# Patient Record
Sex: FEMALE | Race: BLACK OR AFRICAN AMERICAN | Employment: FULL TIME | URBAN - METROPOLITAN AREA
[De-identification: names, ages, dates, MRNs, and addresses within clinical notes are randomized per-mention and may not be internally consistent; named-entity substitution may affect disease eponyms.]

---

## 2022-05-26 NOTE — PROGRESS NOTES
ANNUAL EXAM AGES 18-39    Kasandra Whalen is a [de-identified] ,  24 y.o. female   No LMP recorded. She presents for her annual checkup. She is having no significant problems. Menstrual status:  Her periods are absent due to Depo Provera. Contraception:  The current method of family planning is Depo Provera. Shot pass due. Wants refill. Sexual history:  She  reports being sexually active and has had partner(s) who are female. She reports using the following method of birth control/protection: Injection. Medical conditions:  Since her last annual GYN exam about one year ago, she has had the following changes in her health history: none. Pap and Mammogram History:  Her most recent Pap smear was normal, obtained 2021. The patient has never had a mammogram.    Gyn Flowsheet:  No flowsheet data found. Breast Cancer History:  neg    Medical History: There is no problem list on file for this patient. Past Medical History:   Diagnosis Date    Migraines     Severe dysmenorrhea      No past surgical history on file. OB History    Para Term  AB Living   0 0 0 0 0 0   SAB IAB Ectopic Molar Multiple Live Births   0 0 0 0 0 0     Social History     Socioeconomic History    Marital status: SINGLE   Tobacco Use    Smoking status: Never Smoker    Smokeless tobacco: Never Used   Vaping Use    Vaping Use: Never used   Substance and Sexual Activity    Sexual activity: Yes     Partners: Female     Birth control/protection: Injection      No family history on file. No current outpatient medications on file prior to visit. No current facility-administered medications on file prior to visit.      No Known Allergies    Review of Systems:  History obtained from the patient-negative for:  Constitutional: weight loss, fever, night sweats  HEENT: hearing loss, earache, congestion, snoring, sorethroat  CV: chest pain, palpitations, edema  Resp: cough, shortness of breath, wheezing  Breast: breast lumps, nipple discharge, galactorrhea  GI: change in bowel habits, abdominal pain, black or bloody stools  : frequency, dysuria, hematuria, vaginal discharge  MSK: back pain, joint pain, muscle pain  Skin: itching, rash, hives  Neuro: dizziness, headache, confusion, weakness  Psych: anxiety, depression, change in mood  Heme/lymph: bleeding, bruising, pallor    Physical Exam  There were no vitals taken for this visit.     Constitutional  · Appearance: well-nourished, well developed, alert, in no acute distress    HENT  · Head and Face: appears normal    Neck  · Inspection/Palpation: normal appearance, no masses or tenderness  · Lymph Nodes: no lymphadenopathy present  · Thyroid: gland size normal, nontender, no nodules or masses present on palpation    Chest  · Respiratory Effort: breathing unlabored  · Auscultation: normal breath sounds    Cardiovascular  · Heart:  · Auscultation: regular rate and rhythm without murmur    Breasts  · Inspection of Breasts: breasts symmetrical, no skin changes, no discharge present, nipple appearance normal, no skin retraction present  · Palpation of Breasts and Axillae: no masses present on palpation, no breast tenderness  · Axillary Lymph Nodes: no lymphadenopathy present    Gastrointestinal  · Abdominal Examination: abdomen non-tender to palpation, normal bowel sounds, no masses present  · Liver and spleen: no hepatomegaly present, spleen not palpable  · Hernias: no hernias identified    Genitourinary  · External Genitalia: normal appearance for age, no discharge present, no tenderness present, no inflammatory lesions present, no masses present, no atrophy present  · Vagina: normal vaginal vault without central or paravaginal defects, no discharge present, no inflammatory lesions present, no masses present  · Bladder: non-tender to palpation  · Urethra: appears normal  · Cervix: normal   · Uterus: normal size, shape and consistency  · Adnexa: no adnexal tenderness present, no adnexal masses present  · Perineum: perineum within normal limits, no evidence of trauma, no rashes or skin lesions present  · Anus: anus within normal limits, no hemorrhoids present  · Inguinal Lymph Nodes: no lymphadenopathy present    Skin  · General Inspection: no rash, no lesions identified    Neurologic/Psychiatric  · Mental Status:  · Orientation: grossly oriented to person, place and time  · Mood and Affect: mood normal, affect appropriate    Labs:  No results found for this or any previous visit (from the past 12 hour(s)). Assessment:  Routine gynecologic examination  Her current medical status is satisfactory with no evidence of significant gynecologic issues.     Plan:  Counseled re: diet, exercise, healthy lifestyle  Return for yearly wellness visits  Refill Depo   Rec screening mammo at 40

## 2022-05-27 ENCOUNTER — OFFICE VISIT (OUTPATIENT)
Dept: OBGYN CLINIC | Age: 22
End: 2022-05-27
Payer: COMMERCIAL

## 2022-05-27 VITALS
DIASTOLIC BLOOD PRESSURE: 80 MMHG | HEIGHT: 66 IN | BODY MASS INDEX: 16.07 KG/M2 | SYSTOLIC BLOOD PRESSURE: 116 MMHG | WEIGHT: 100 LBS

## 2022-05-27 DIAGNOSIS — Z01.419 ROUTINE GYNECOLOGICAL EXAMINATION: Primary | ICD-10-CM

## 2022-05-27 PROCEDURE — 99203 OFFICE O/P NEW LOW 30 MIN: CPT | Performed by: OBSTETRICS & GYNECOLOGY

## 2022-05-27 RX ORDER — MEDROXYPROGESTERONE ACETATE 150 MG/ML
INJECTION, SUSPENSION INTRAMUSCULAR
COMMUNITY
Start: 2022-02-20 | End: 2022-05-27 | Stop reason: SDUPTHER

## 2022-05-27 RX ORDER — MEDROXYPROGESTERONE ACETATE 150 MG/ML
INJECTION, SUSPENSION INTRAMUSCULAR
Qty: 1 EACH | Refills: 3 | Status: SHIPPED | OUTPATIENT
Start: 2022-05-27

## 2022-05-27 RX ORDER — TRETINOIN 0.5 MG/G
CREAM TOPICAL
COMMUNITY
Start: 2022-04-26

## 2022-06-01 LAB
C TRACH RRNA CVX QL NAA+PROBE: NEGATIVE
CYTOLOGIST CVX/VAG CYTO: NORMAL
CYTOLOGY CVX/VAG DOC CYTO: NORMAL
CYTOLOGY CVX/VAG DOC THIN PREP: NORMAL
DX ICD CODE: NORMAL
HPV I/H RISK 4 DNA CVX QL PROBE+SIG AMP: NEGATIVE
Lab: NORMAL
N GONORRHOEA RRNA CVX QL NAA+PROBE: NEGATIVE
OTHER STN SPEC: NORMAL
STAT OF ADQ CVX/VAG CYTO-IMP: NORMAL
T VAGINALIS RRNA SPEC QL NAA+PROBE: NEGATIVE

## 2022-08-30 ENCOUNTER — HOSPITAL ENCOUNTER (EMERGENCY)
Age: 22
Discharge: HOME | End: 2022-08-30
Attending: STUDENT IN AN ORGANIZED HEALTH CARE EDUCATION/TRAINING PROGRAM
Payer: COMMERCIAL

## 2022-08-30 VITALS
WEIGHT: 105 LBS | HEIGHT: 67 IN | TEMPERATURE: 98.5 F | SYSTOLIC BLOOD PRESSURE: 114 MMHG | DIASTOLIC BLOOD PRESSURE: 82 MMHG | HEART RATE: 105 BPM | OXYGEN SATURATION: 99 % | RESPIRATION RATE: 16 BRPM | BODY MASS INDEX: 16.48 KG/M2

## 2022-08-30 DIAGNOSIS — J02.9 ACUTE PHARYNGITIS, UNSPECIFIED ETIOLOGY: Primary | ICD-10-CM

## 2022-08-30 LAB — DEPRECATED S PYO AG THROAT QL EIA: NEGATIVE

## 2022-08-30 PROCEDURE — 87070 CULTURE OTHR SPECIMN AEROBIC: CPT

## 2022-08-30 PROCEDURE — 87880 STREP A ASSAY W/OPTIC: CPT

## 2022-08-30 PROCEDURE — 74011250637 HC RX REV CODE- 250/637: Performed by: STUDENT IN AN ORGANIZED HEALTH CARE EDUCATION/TRAINING PROGRAM

## 2022-08-30 PROCEDURE — 99283 EMERGENCY DEPT VISIT LOW MDM: CPT

## 2022-08-30 RX ORDER — IBUPROFEN 400 MG/1
400 TABLET ORAL
Status: COMPLETED | OUTPATIENT
Start: 2022-08-30 | End: 2022-08-30

## 2022-08-30 RX ORDER — IBUPROFEN 400 MG/1
400 TABLET ORAL
Qty: 20 TABLET | Refills: 0 | Status: SHIPPED | OUTPATIENT
Start: 2022-08-30

## 2022-08-30 RX ADMIN — IBUPROFEN 400 MG: 400 TABLET ORAL at 13:24

## 2022-08-30 NOTE — ED PROVIDER NOTES
Patient is a relatively healthy 66-year-old female presenting today with sore throat she has had symptoms for the past 2 to 3 days. She reports that it hurts on the left side of her throat. No trouble breathing or swallowing. No fevers. No ear pain. No rashes or wounds. No other complaints noted at this time. She has not taken anything for the pain. Describes a sharp pain isolated to the left throat. Past Medical History:   Diagnosis Date    Migraines     Severe dysmenorrhea        History reviewed. No pertinent surgical history. Family History:   Problem Relation Age of Onset    Heart Failure Maternal Grandmother     Breast Cancer Maternal Great Grandmother        Social History     Socioeconomic History    Marital status: SINGLE     Spouse name: Not on file    Number of children: Not on file    Years of education: Not on file    Highest education level: Not on file   Occupational History    Not on file   Tobacco Use    Smoking status: Never    Smokeless tobacco: Never   Vaping Use    Vaping Use: Never used   Substance and Sexual Activity    Alcohol use: Yes     Comment: socially    Drug use: Never    Sexual activity: Yes     Partners: Female     Birth control/protection: Injection   Other Topics Concern    Not on file   Social History Narrative    Not on file     Social Determinants of Health     Financial Resource Strain: Not on file   Food Insecurity: Not on file   Transportation Needs: Not on file   Physical Activity: Not on file   Stress: Not on file   Social Connections: Not on file   Intimate Partner Violence: Not on file   Housing Stability: Not on file         ALLERGIES: Patient has no known allergies. Review of Systems   Constitutional:  Negative for fever. HENT:  Positive for sore throat. Respiratory:  Negative for shortness of breath. All other systems reviewed and are negative.     Vitals:    08/30/22 1256 08/30/22 1310 08/30/22 1355   BP: 129/84  114/82   Pulse: (!) 105 Resp: 16     Temp: 98.5 °F (36.9 °C)     SpO2: 99% 99%    Weight: 47.6 kg (105 lb)     Height: 5' 7\" (1.702 m)              Physical Exam  Constitutional:       General: She is not in acute distress. Appearance: She is well-developed. HENT:      Head: Normocephalic. Mouth/Throat:      Comments: Mild erythema noted to the left posterior oropharynx without vesicular lesions. No tonsillar swelling. Uvula midline. .  No trismus. No stridor. Eyes:      Conjunctiva/sclera: Conjunctivae normal.   Pulmonary:      Effort: Pulmonary effort is normal. No respiratory distress. Musculoskeletal:         General: Normal range of motion. Cervical back: Normal range of motion. Skin:     General: Skin is warm and dry. Capillary Refill: Capillary refill takes less than 2 seconds. Psychiatric:         Behavior: Behavior normal.        MDM         Procedures      Negative  Patient is a 19-year-old female who is otherwise well-appearing and in no acute distress with stable vital signs here today with left-sided throat pain. On my clinical exam I see no evidence of a peritonsillar abscess. Strep negative. Uvula midline. No vesicular lesions. No other rash to suggest HFM. Suspect viral etiology. Okay for discharge home with conservative management. Return precautions provided. ICD-10-CM ICD-9-CM    1.  Acute pharyngitis, unspecified etiology  J02.9 SKIP/ Agus Manzanares 33, DO

## 2022-09-01 LAB
BACTERIA SPEC CULT: NORMAL
SERVICE CMNT-IMP: NORMAL

## 2023-01-22 ENCOUNTER — HOSPITAL ENCOUNTER (EMERGENCY)
Age: 23
Discharge: HOME | End: 2023-01-22
Attending: STUDENT IN AN ORGANIZED HEALTH CARE EDUCATION/TRAINING PROGRAM
Payer: COMMERCIAL

## 2023-01-22 VITALS
BODY MASS INDEX: 16.23 KG/M2 | WEIGHT: 103.4 LBS | HEART RATE: 88 BPM | DIASTOLIC BLOOD PRESSURE: 75 MMHG | HEIGHT: 67 IN | TEMPERATURE: 99 F | SYSTOLIC BLOOD PRESSURE: 120 MMHG | RESPIRATION RATE: 16 BRPM | OXYGEN SATURATION: 97 %

## 2023-01-22 DIAGNOSIS — R09.89 RUNNY NOSE: Primary | ICD-10-CM

## 2023-01-22 PROCEDURE — 99282 EMERGENCY DEPT VISIT SF MDM: CPT

## 2023-01-22 NOTE — Clinical Note
[address]  [facility]  [address] [id number]  [phone]    Work/School Note    Date: 1/22/2023    To Whom It May concern:    Vera Moeller was seen and treated today in the emergency room by the following provider(s):  No providers found. Vera Moeller is excused from work/school on 01/24/23 and 01/25/23. She is medically clear to return to work/school on 1/26/2023.        Sincerely,          Leticia Mckeon RN

## 2023-01-22 NOTE — Clinical Note
[address]  [facility]  [address] [id number]  [phone]    Work/School Note    Date: 1/22/2023    To Whom It May concern:    Aimee Joya was seen and treated today in the emergency room by the following provider(s):  Attending Provider: Corazon Smalls DO.      Aimee Joya is excused from work/school on 01/22/23 and 01/23/23. She is medically clear to return to work/school on 1/24/2023.        Sincerely,          Tiffani Lincoln DO

## 2023-01-23 NOTE — ED PROVIDER NOTES
80-year-old female presents ED for evaluation of a runny nose started yesterday. She has had some burning sensation her nose when she breathes. Does feel congested. Has had a headache as well. Also has a sore throat. No cough or shortness of breath. No fevers      Nasal Congestion  Pertinent negatives include no chest pain and no shortness of breath. Past Medical History:   Diagnosis Date    Migraines     Severe dysmenorrhea        History reviewed. No pertinent surgical history. Family History:   Problem Relation Age of Onset    Heart Failure Maternal Grandmother     Breast Cancer Maternal Great Grandmother        Social History     Socioeconomic History    Marital status: SINGLE     Spouse name: Not on file    Number of children: Not on file    Years of education: Not on file    Highest education level: Not on file   Occupational History    Not on file   Tobacco Use    Smoking status: Never    Smokeless tobacco: Never   Vaping Use    Vaping Use: Never used   Substance and Sexual Activity    Alcohol use: Yes     Comment: socially    Drug use: Never    Sexual activity: Yes     Partners: Female     Birth control/protection: Injection   Other Topics Concern    Not on file   Social History Narrative    Not on file     Social Determinants of Health     Financial Resource Strain: Not on file   Food Insecurity: Not on file   Transportation Needs: Not on file   Physical Activity: Not on file   Stress: Not on file   Social Connections: Not on file   Intimate Partner Violence: Not on file   Housing Stability: Not on file         ALLERGIES: Patient has no known allergies. Review of Systems   Constitutional:  Negative for fever. HENT:  Positive for rhinorrhea and sore throat. Respiratory:  Negative for shortness of breath. Cardiovascular:  Negative for chest pain.      Vitals:    01/22/23 1918   BP: 120/75   Pulse: 88   Resp: 16   Temp: 99 °F (37.2 °C)   SpO2: 97%   Weight: 46.9 kg (103 lb 6.4 oz) Height: 5' 7\" (1.702 m)            Physical Exam  Vitals reviewed. Constitutional:       General: She is not in acute distress. Appearance: She is normal weight. She is not toxic-appearing. HENT:      Head: Normocephalic and atraumatic. Right Ear: Tympanic membrane, ear canal and external ear normal.      Left Ear: Tympanic membrane, ear canal and external ear normal.      Nose: Congestion present. Mouth/Throat:      Mouth: Mucous membranes are moist.      Pharynx: Oropharynx is clear. No oropharyngeal exudate or posterior oropharyngeal erythema. Eyes:      Conjunctiva/sclera: Conjunctivae normal.   Cardiovascular:      Rate and Rhythm: Normal rate and regular rhythm. Heart sounds: Normal heart sounds. Pulmonary:      Effort: Pulmonary effort is normal.      Breath sounds: Normal breath sounds. Musculoskeletal:      Cervical back: Normal range of motion. Neurological:      Mental Status: She is alert. Medical Decision Making  58-year-old female who has 1 day of congestion, runny nose, sore throat. ENT exam is reassuring, posterior pharynx is nonerythematous no exudates. Lungs good auscultation bilaterally, is not tachypneic or hypoxic. Afebrile. Discussed likely a viral cold. Recommended saline nose spray for her congestion.   Discharged stable condition with a work note           Procedures

## 2023-01-23 NOTE — ED TRIAGE NOTES
Pt.states she started with runny nose yesterday and today also with headache. States \"burns\" when she breaths in. Feels like she is breathing in dry air. Pt.states she gets headaches every day so isn't really worried about that.  She is more worried about nasal congestion and throat

## 2023-02-16 ENCOUNTER — HOSPITAL ENCOUNTER (EMERGENCY)
Age: 23
Discharge: HOME | End: 2023-02-16
Attending: EMERGENCY MEDICINE
Payer: COMMERCIAL

## 2023-02-16 ENCOUNTER — APPOINTMENT (OUTPATIENT)
Dept: GENERAL RADIOLOGY | Age: 23
End: 2023-02-16
Attending: NURSE PRACTITIONER
Payer: COMMERCIAL

## 2023-02-16 VITALS
BODY MASS INDEX: 15.7 KG/M2 | RESPIRATION RATE: 18 BRPM | OXYGEN SATURATION: 100 % | SYSTOLIC BLOOD PRESSURE: 122 MMHG | TEMPERATURE: 98 F | WEIGHT: 100 LBS | HEIGHT: 67 IN | HEART RATE: 72 BPM | DIASTOLIC BLOOD PRESSURE: 76 MMHG

## 2023-02-16 DIAGNOSIS — R07.89 MUSCULOSKELETAL CHEST PAIN: Primary | ICD-10-CM

## 2023-02-16 LAB
ALBUMIN SERPL-MCNC: 4.1 G/DL (ref 3.5–5.2)
ALBUMIN/GLOB SERPL: 1.3 (ref 1.1–2.2)
ALP SERPL-CCNC: 62 U/L (ref 35–104)
ALT SERPL-CCNC: 5 U/L (ref 10–35)
ANION GAP SERPL CALC-SCNC: 12 MMOL/L (ref 5–15)
AST SERPL-CCNC: 18 U/L (ref 10–35)
BASOPHILS # BLD: 0.1 K/UL (ref 0–1)
BASOPHILS NFR BLD: 1 % (ref 0–1)
BILIRUB SERPL-MCNC: 0.4 MG/DL (ref 0.2–1)
BUN SERPL-MCNC: 14 MG/DL (ref 6–20)
BUN/CREAT SERPL: 21 (ref 12–20)
CALCIUM SERPL-MCNC: 8.8 MG/DL (ref 8.6–10)
CHLORIDE SERPL-SCNC: 106 MMOL/L (ref 98–107)
CO2 SERPL-SCNC: 23 MMOL/L (ref 22–29)
COMMENT, HOLDF: NORMAL
CREAT SERPL-MCNC: 0.67 MG/DL (ref 0.5–0.9)
DIFFERENTIAL METHOD BLD: ABNORMAL
EOSINOPHIL # BLD: 0.1 K/UL (ref 0–0.4)
EOSINOPHIL NFR BLD: 2 %
ERYTHROCYTE [DISTWIDTH] IN BLOOD BY AUTOMATED COUNT: 14.7 % (ref 11.5–14.5)
GLOBULIN SER CALC-MCNC: 3.2 G/DL (ref 2–4)
GLUCOSE SERPL-MCNC: 92 MG/DL (ref 65–100)
HCT VFR BLD AUTO: 32.5 % (ref 35–47)
HGB BLD-MCNC: 10.3 G/DL (ref 11.5–16)
IMM GRANULOCYTES # BLD AUTO: 0 K/UL (ref 0–0.04)
IMM GRANULOCYTES NFR BLD AUTO: 0 % (ref 0–0.5)
LYMPHOCYTES # BLD: 2.2 K/UL (ref 0.8–3.5)
LYMPHOCYTES NFR BLD: 45 % (ref 12–49)
MCH RBC QN AUTO: 24.5 PG (ref 26–34)
MCHC RBC AUTO-ENTMCNC: 31.7 G/DL (ref 30–36.5)
MCV RBC AUTO: 77.4 FL (ref 80–99)
MONOCYTES # BLD: 0.6 K/UL (ref 0–1)
MONOCYTES NFR BLD: 11 % (ref 5–13)
NEUTS SEG # BLD: 2.1 K/UL (ref 1.8–8)
NEUTS SEG NFR BLD: 41 % (ref 32–75)
NRBC # BLD: 0 K/UL (ref 0–0.01)
NRBC BLD-RTO: 0 PER 100 WBC
PLATELET # BLD AUTO: 387 K/UL (ref 150–400)
PMV BLD AUTO: 11.3 FL (ref 8.9–12.9)
POTASSIUM SERPL-SCNC: 4.2 MMOL/L (ref 3.5–5.1)
PROT SERPL-MCNC: 7.3 G/DL (ref 6.4–8.3)
RBC # BLD AUTO: 4.2 M/UL (ref 3.8–5.2)
SAMPLES BEING HELD,HOLD: NORMAL
SODIUM SERPL-SCNC: 141 MMOL/L (ref 136–145)
TROPONIN I BLD-MCNC: <0.04 NG/ML (ref 0–0.08)
WBC # BLD AUTO: 5.1 K/UL (ref 3.6–11)

## 2023-02-16 PROCEDURE — 74011000250 HC RX REV CODE- 250: Performed by: NURSE PRACTITIONER

## 2023-02-16 PROCEDURE — 36415 COLL VENOUS BLD VENIPUNCTURE: CPT

## 2023-02-16 PROCEDURE — 74011250637 HC RX REV CODE- 250/637: Performed by: NURSE PRACTITIONER

## 2023-02-16 PROCEDURE — 71046 X-RAY EXAM CHEST 2 VIEWS: CPT

## 2023-02-16 PROCEDURE — 85025 COMPLETE CBC W/AUTO DIFF WBC: CPT

## 2023-02-16 PROCEDURE — 99285 EMERGENCY DEPT VISIT HI MDM: CPT

## 2023-02-16 PROCEDURE — 80053 COMPREHEN METABOLIC PANEL: CPT

## 2023-02-16 PROCEDURE — 93005 ELECTROCARDIOGRAM TRACING: CPT

## 2023-02-16 RX ORDER — IBUPROFEN 600 MG/1
600 TABLET ORAL
Qty: 20 TABLET | Refills: 0 | Status: SHIPPED | OUTPATIENT
Start: 2023-02-16

## 2023-02-16 RX ORDER — CYCLOBENZAPRINE HCL 10 MG
10 TABLET ORAL
Qty: 15 TABLET | Refills: 0 | Status: SHIPPED | OUTPATIENT
Start: 2023-02-16

## 2023-02-16 RX ADMIN — ALUMINUM HYDROXIDE, MAGNESIUM HYDROXIDE, AND SIMETHICONE 40 ML: 200; 200; 20 SUSPENSION ORAL at 11:32

## 2023-02-16 NOTE — ED PROVIDER NOTES
HPI     Past Medical History:   Diagnosis Date    Migraines     Severe dysmenorrhea        No past surgical history on file. Family History:   Problem Relation Age of Onset    Heart Failure Maternal Grandmother     Breast Cancer Maternal Great Grandmother        Social History     Socioeconomic History    Marital status: SINGLE     Spouse name: Not on file    Number of children: Not on file    Years of education: Not on file    Highest education level: Not on file   Occupational History    Not on file   Tobacco Use    Smoking status: Never    Smokeless tobacco: Never   Vaping Use    Vaping Use: Never used   Substance and Sexual Activity    Alcohol use: Yes     Comment: socially    Drug use: Never    Sexual activity: Yes     Partners: Female     Birth control/protection: Injection   Other Topics Concern    Not on file   Social History Narrative    Not on file     Social Determinants of Health     Financial Resource Strain: Not on file   Food Insecurity: Not on file   Transportation Needs: Not on file   Physical Activity: Not on file   Stress: Not on file   Social Connections: Not on file   Intimate Partner Violence: Not on file   Housing Stability: Not on file         ALLERGIES: Patient has no known allergies. Review of Systems    Vitals:    02/16/23 1244 02/16/23 1259 02/16/23 1314 02/16/23 1329   BP: 112/74 120/75 116/80 122/76   Pulse:       Resp:       Temp:       SpO2: 100% 100% 100% 100%   Weight:       Height:                Physical Exam     Medical Decision Making  Amount and/or Complexity of Data Reviewed  Labs: ordered. Radiology: ordered. ECG/medicine tests: ordered. Risk  Prescription drug management. ED Course as of 02/16/23 1412   Thu Feb 16, 2023   1114 ECG obtained February 16, 2023 at 1103. Sinus rhythm with sinus rhythm arrhythmia at a rate of 73, normal axis. No ST segment changes. No STEMI. Some Baseline Artifact does limit interpretation.  [SK]      ED Course User Index  [SK] Jose Nava MD     Labs Reviewed   CBC WITH AUTOMATED DIFF - Abnormal; Notable for the following components:       Result Value    HGB 10.3 (*)     HCT 32.5 (*)     MCV 77.4 (*)     MCH 24.5 (*)     RDW 14.7 (*)     EOSINOPHILS 2 (*)     All other components within normal limits   METABOLIC PANEL, COMPREHENSIVE - Abnormal; Notable for the following components:    BUN/Creatinine ratio 21 (*)     ALT (SGPT) 5 (*)     All other components within normal limits   SAMPLES BEING HELD   POC TROPONIN-I       XR CHEST PA LAT    Result Date: 2/16/2023  Normal chest.      2:12 PM  Pt has been reexamined. Pt has no new complaints, changes or physical findings. Care plan outlined and precautions discussed. All available results were reviewed with pt. All medications were reviewed with pt. All of pt's questions and concerns were addressed. Pt agrees to F/U as instructed and agrees to return to ED upon further deterioration. Pt is ready to go home. Minerva Calvo NP    Please note that this dictation was completed with [employer], the [employer] voice recognition software. Quite often unanticipated grammatical, syntax, homophones, and other interpretive errors are inadvertently transcribed by the computer software. Please disregard these errors. Please excuse any errors that have escaped final proofreading. Thank you.     Procedures Comments: Negative    Musculoskeletal:         General: No tenderness. Normal range of motion. Cervical back: Normal range of motion and neck supple. Skin:     General: Skin is warm and dry. Capillary Refill: Capillary refill takes less than 2 seconds. Coloration: Skin is not pale. Findings: No erythema or rash. Neurological:      General: No focal deficit present. Mental Status: She is alert and oriented to person, place, and time. Motor: No weakness. Coordination: Coordination normal.   Psychiatric:         Mood and Affect: Mood normal.         Behavior: Behavior normal.         Thought Content: Thought content normal.         Judgment: Judgment normal.        Medical Decision Making  This is a 25year old female with c/o sharp chest pain in the middle of her chest that started around 1130 last night. Pt denies N/V/D,SOB and abd pain. Differential diagnosis includes PE, MI, musculoskeletal pain and others. 1. Previous notes (external to Emergency room) were reviewed: chart review    2. History was obtained by: patient    3. Chronic medical issues affecting this visit:   Past Medical History:  No date: Migraines  No date: Severe dysmenorrhea  No past surgical history on file. 4. I ordered the following tests, followed by review of final reads by lab and radiology: cbc, cmp, troponin, cxr    5. I considered ordering: lipase    6. Medical intervention: physical assessment, imaging and lab data      Surgical intervention: none indicated    7. Social determinants of health: none    8 Final diagnosis: musculoskeletal chest pain. Medications prescribed: flexeril, ibuprofen    9. Disposition: Discharge to home and follow up with PCP, return to the emergency room with worsening symptoms. Patient in agreement with plan of care.         Problems Addressed:  Musculoskeletal chest pain: acute illness or injury    Amount and/or Complexity of Data Reviewed  External Data Reviewed: notes. Labs: ordered. Decision-making details documented in ED Course. Radiology: ordered and independent interpretation performed. Decision-making details documented in ED Course. ECG/medicine tests: ordered. Risk  Prescription drug management. ED Course as of 02/16/23 1412   Thu Feb 16, 2023   1114 ECG obtained February 16, 2023 at 1103. Sinus rhythm with sinus rhythm arrhythmia at a rate of 73, normal axis. No ST segment changes. No STEMI. Some Baseline Artifact does limit interpretation. [SK]      ED Course User Index  [SK] Lee Ann Johnson MD     Labs Reviewed   CBC WITH AUTOMATED DIFF - Abnormal; Notable for the following components:       Result Value    HGB 10.3 (*)     HCT 32.5 (*)     MCV 77.4 (*)     MCH 24.5 (*)     RDW 14.7 (*)     EOSINOPHILS 2 (*)     All other components within normal limits   METABOLIC PANEL, COMPREHENSIVE - Abnormal; Notable for the following components:    BUN/Creatinine ratio 21 (*)     ALT (SGPT) 5 (*)     All other components within normal limits   SAMPLES BEING HELD   POC TROPONIN-I       XR CHEST PA LAT    Result Date: 2/16/2023  Normal chest.      2:12 PM  Pt has been reexamined. Pt has no new complaints, changes or physical findings. Care plan outlined and precautions discussed. All available results were reviewed with pt. All medications were reviewed with pt. All of pt's questions and concerns were addressed. Pt agrees to F/U as instructed and agrees to return to ED upon further deterioration. Pt is ready to go home. Priscilla Wynn NP    Please note that this dictation was completed with [employer], the computer voice recognition software. Quite often unanticipated grammatical, syntax, homophones, and other interpretive errors are inadvertently transcribed by the computer software. Please disregard these errors. Please excuse any errors that have escaped final proofreading. Thank you.     Procedures

## 2023-02-16 NOTE — ED TRIAGE NOTES
Patient arrives to ed via pov with c/o sharp chest pain in the middle of her chest around 1130 last night, pt denies previous hx of event, pmh migraines. Pt denies N/V/D,SOB and abd pain.

## 2023-02-16 NOTE — Clinical Note
Maryam Avalos was seen and treated in our emergency department on 2/16/2023.     Return to work on February 23, 2023    Tamanna Escalante NP

## 2023-02-16 NOTE — ED NOTES
Patient rounding complete. Patient only reports pain when twisting her upper body area. 7/10. Family at bedside. Updated VS documented. Bed rails up x 2, call bell within reach. No other questions or concerns at this time from patient or family.

## 2023-02-16 NOTE — ED NOTES
Pt given discharge instructions, patient education, 2 prescriptions and follow up information. Pt verbalizes understanding. All questions answered. Pt discharged to home in private vehicle, ambulatory. Pt A&Ox4, RA, pain controlled.

## 2023-02-18 LAB
ATRIAL RATE: 73 BPM
CALCULATED P AXIS, ECG09: -15 DEGREES
CALCULATED R AXIS, ECG10: 84 DEGREES
CALCULATED T AXIS, ECG11: 52 DEGREES
DIAGNOSIS, 93000: NORMAL
P-R INTERVAL, ECG05: 92 MS
Q-T INTERVAL, ECG07: 376 MS
QRS DURATION, ECG06: 76 MS
QTC CALCULATION (BEZET), ECG08: 414 MS
VENTRICULAR RATE, ECG03: 73 BPM

## 2023-03-30 ENCOUNTER — APPOINTMENT (OUTPATIENT)
Dept: CT IMAGING | Age: 23
End: 2023-03-30
Attending: NURSE PRACTITIONER
Payer: COMMERCIAL

## 2023-03-30 ENCOUNTER — APPOINTMENT (OUTPATIENT)
Dept: GENERAL RADIOLOGY | Age: 23
End: 2023-03-30
Attending: NURSE PRACTITIONER
Payer: COMMERCIAL

## 2023-03-30 ENCOUNTER — HOSPITAL ENCOUNTER (EMERGENCY)
Age: 23
Discharge: HOME | End: 2023-03-30
Attending: EMERGENCY MEDICINE
Payer: COMMERCIAL

## 2023-03-30 VITALS
HEART RATE: 61 BPM | SYSTOLIC BLOOD PRESSURE: 115 MMHG | OXYGEN SATURATION: 100 % | DIASTOLIC BLOOD PRESSURE: 73 MMHG | HEIGHT: 66 IN | BODY MASS INDEX: 16.3 KG/M2 | TEMPERATURE: 98 F | RESPIRATION RATE: 14 BRPM | WEIGHT: 101.41 LBS
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
## 2023-03-30 PROCEDURE — 73552 X-RAY EXAM OF FEMUR 2/>: CPT

## 2023-03-30 PROCEDURE — 99284 EMERGENCY DEPT VISIT MOD MDM: CPT

## 2023-03-30 PROCEDURE — 73030 X-RAY EXAM OF SHOULDER: CPT

## 2023-03-30 PROCEDURE — 70450 CT HEAD/BRAIN W/O DYE: CPT

## 2023-03-30 PROCEDURE — 74011250636 HC RX REV CODE- 250/636: Performed by: NURSE PRACTITIONER

## 2023-03-30 PROCEDURE — 96372 THER/PROPH/DIAG INJ SC/IM: CPT

## 2023-03-30 PROCEDURE — 74011250637 HC RX REV CODE- 250/637: Performed by: NURSE PRACTITIONER

## 2023-03-30 RX ORDER — CYCLOBENZAPRINE HCL 10 MG
10 TABLET ORAL
Status: COMPLETED | OUTPATIENT
Start: 2023-03-30 | End: 2023-03-30

## 2023-03-30 RX ORDER — KETOROLAC TROMETHAMINE 30 MG/ML
30 INJECTION, SOLUTION INTRAMUSCULAR; INTRAVENOUS
Status: COMPLETED | OUTPATIENT
Start: 2023-03-30 | End: 2023-03-30

## 2023-03-30 RX ORDER — CYCLOBENZAPRINE HCL 10 MG
10 TABLET ORAL
Qty: 15 TABLET | Refills: 0 | Status: SHIPPED | OUTPATIENT
Start: 2023-03-30 | End: 2023-04-04

## 2023-03-30 RX ORDER — IBUPROFEN 600 MG/1
600 TABLET ORAL
Qty: 20 TABLET | Refills: 0 | Status: SHIPPED | OUTPATIENT
Start: 2023-03-30

## 2023-03-30 RX ADMIN — CYCLOBENZAPRINE 10 MG: 10 TABLET, FILM COATED ORAL at 11:54

## 2023-03-30 RX ADMIN — KETOROLAC TROMETHAMINE 30 MG: 30 INJECTION, SOLUTION INTRAMUSCULAR at 11:54
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
## 2023-03-30 NOTE — Clinical Note
[address]  [facility] EMER[facility]ENC[facility] DEPT  [address] [id number]  [phone]    Work/School Note    Date: 3/30/2023    To Whom It May concern:    Sandra Miller was seen and treated today in the emergency room by the following provider(s):  Attending Provider: Elizabeth Monge MD  Nurse Practitioner: Micheline Crain NP. Sandra Miller is excused from work/school on 3/30/2023 through 4/1/2023. She is medically clear to return to work/school on 4/2/2023.          Sincerely,          Juan Manuel Whitney NP
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
## 2023-04-26 DIAGNOSIS — Z30.9 ENCOUNTER FOR CONTRACEPTIVE MANAGEMENT, UNSPECIFIED TYPE: Primary | ICD-10-CM

## 2023-04-26 RX ORDER — MEDROXYPROGESTERONE ACETATE 150 MG/ML
150 INJECTION, SUSPENSION INTRAMUSCULAR ONCE
Qty: 1 ML | Refills: 0 | Status: SHIPPED | OUTPATIENT
Start: 2023-04-26 | End: 2023-04-26

## 2023-04-30 RX ORDER — CYCLOBENZAPRINE HCL 10 MG
TABLET ORAL 3 TIMES DAILY PRN
COMMUNITY
Start: 2023-02-16

## 2023-04-30 RX ORDER — IBUPROFEN 600 MG/1
TABLET ORAL EVERY 6 HOURS PRN
COMMUNITY
Start: 2023-02-16

## 2023-05-08 ENCOUNTER — HOSPITAL ENCOUNTER (EMERGENCY)
Facility: HOSPITAL | Age: 23
Discharge: HOME | End: 2023-05-08
Attending: EMERGENCY MEDICINE
Payer: COMMERCIAL

## 2023-05-08 VITALS
SYSTOLIC BLOOD PRESSURE: 116 MMHG | DIASTOLIC BLOOD PRESSURE: 79 MMHG | HEART RATE: 60 BPM | TEMPERATURE: 98.7 F | OXYGEN SATURATION: 99 % | RESPIRATION RATE: 17 BRPM

## 2023-05-08 DIAGNOSIS — H00.012 HORDEOLUM EXTERNUM OF RIGHT LOWER EYELID: Primary | ICD-10-CM

## 2023-05-08 PROCEDURE — 99282 EMERGENCY DEPT VISIT SF MDM: CPT

## 2023-05-08 ASSESSMENT — ENCOUNTER SYMPTOMS
VOMITING: 0
NAUSEA: 0
DIARRHEA: 0
SORE THROAT: 0
COUGH: 0
ABDOMINAL PAIN: 0
EYE PAIN: 0
SHORTNESS OF BREATH: 0

## 2023-05-08 ASSESSMENT — PAIN DESCRIPTION - LOCATION: LOCATION: EYE

## 2023-05-08 ASSESSMENT — LIFESTYLE VARIABLES
HOW OFTEN DO YOU HAVE A DRINK CONTAINING ALCOHOL: MONTHLY OR LESS
HOW MANY STANDARD DRINKS CONTAINING ALCOHOL DO YOU HAVE ON A TYPICAL DAY: 1 OR 2

## 2023-05-08 ASSESSMENT — PAIN SCALES - GENERAL: PAINLEVEL_OUTOF10: 8

## 2023-05-08 ASSESSMENT — PAIN - FUNCTIONAL ASSESSMENT: PAIN_FUNCTIONAL_ASSESSMENT: 0-10

## 2023-05-08 NOTE — ED PROVIDER NOTES
precautions. REASSESSMENT            CONSULTS:  None    PROCEDURES:  Unless otherwise noted below, none     Procedures      FINAL IMPRESSION      1.  Hordeolum externum of right lower eyelid          DISPOSITION/PLAN   DISPOSITION Decision To Discharge 05/08/2023 12:44:29 PM      PATIENT REFERRED TO:  Matthew Mcneil MD  [address] [facility] [address]  [phone]    Schedule an appointment as soon as possible for a visit       Ava Argueta, [address] [place] [address]  [phone]    Schedule an appointment as soon as possible for a visit       [facility]  [address]  [phone]  Go to   If symptoms worsen or change      DISCHARGE MEDICATIONS:  Discharge Medication List as of 5/8/2023  1:04 PM            (Please note that portions of this note were completed with a voice recognition program.  Efforts were made to edit the dictations but occasionally words are mis-transcribed.)    CINDY Hobbs (electronically signed)  Emergency Attending Physician / Physician Assistant / Nurse Practitioner             CINDY Polk  05/08/23 1272

## 2023-07-14 DIAGNOSIS — Z30.9 ENCOUNTER FOR CONTRACEPTIVE MANAGEMENT, UNSPECIFIED: ICD-10-CM

## 2023-07-17 ENCOUNTER — TELEPHONE (OUTPATIENT)
Age: 23
End: 2023-07-17

## 2023-07-17 RX ORDER — MEDROXYPROGESTERONE ACETATE 150 MG/ML
150 INJECTION, SUSPENSION INTRAMUSCULAR ONCE
Qty: 1 ML | Refills: 0 | Status: SHIPPED | OUTPATIENT
Start: 2023-07-17 | End: 2023-07-17

## 2023-07-17 RX ORDER — MEDROXYPROGESTERONE ACETATE 150 MG/ML
INJECTION, SUSPENSION INTRAMUSCULAR
OUTPATIENT
Start: 2023-07-17

## 2023-07-17 RX ORDER — MEDROXYPROGESTERONE ACETATE 150 MG/ML
INJECTION, SUSPENSION INTRAMUSCULAR
Refills: 3 | OUTPATIENT
Start: 2023-07-17

## 2023-07-17 NOTE — TELEPHONE ENCOUNTER
Two patient identifiers used  Mother advised of need for daughter to sign hippa form    25year old patient last seen in the office on 5/17/2022 and has next appointment on 8/4/2023 for ae and depo injection    Prescription sent as per MD verbal order to get patient to her scheduled appointment

## 2023-08-04 ENCOUNTER — OFFICE VISIT (OUTPATIENT)
Age: 23
End: 2023-08-04

## 2023-08-04 VITALS
WEIGHT: 102.6 LBS | SYSTOLIC BLOOD PRESSURE: 131 MMHG | BODY MASS INDEX: 16.49 KG/M2 | HEIGHT: 66 IN | DIASTOLIC BLOOD PRESSURE: 90 MMHG

## 2023-08-04 DIAGNOSIS — Z01.419 WELL WOMAN EXAM WITH ROUTINE GYNECOLOGICAL EXAM: Primary | ICD-10-CM

## 2023-08-04 RX ORDER — MEDROXYPROGESTERONE ACETATE 150 MG/ML
INJECTION, SUSPENSION INTRAMUSCULAR
Qty: 1 ML | Refills: 3 | Status: SHIPPED | OUTPATIENT
Start: 2023-08-04

## 2023-08-04 RX ORDER — MEDROXYPROGESTERONE ACETATE 150 MG/ML
INJECTION, SUSPENSION INTRAMUSCULAR
COMMUNITY
Start: 2023-07-17 | End: 2023-08-04 | Stop reason: SDUPTHER

## 2023-08-04 NOTE — PROGRESS NOTES
ANNUAL EXAM AGES 18-39    History:  Dione Saucedo is a 25y.o. year old [address] / [address] [place] [address] female   No LMP recorded. Patient has had an injection. She presents for her annual checkup. No LMP recorded. Patient has had an injection. Her periods are absent in flow. Problems: no problems  Birth Control: Depo-Provera injections. Mom gave her shot on 2023. Last Pap: normal obtained 1 year(s) ago. She does not have a history of CHERRI 2, 3 or cervical cancer. With regard to the Gardisil vaccine, she has received all 3 injections    Medical History:  Problem List:  There are no problems to display for this patient. Past Medical History:   Diagnosis Date    Migraine     Migraines     Severe dysmenorrhea      History reviewed. No pertinent surgical history. OB History    Para Term  AB Living   0 0 0 0 0 0   SAB IAB Ectopic Molar Multiple Live Births   0 0 0 0 0 0     Social History     Socioeconomic History    Marital status: Single     Spouse name: None    Number of children: 0    Years of education: None    Highest education level: None   Tobacco Use    Smoking status: Former     Types: Cigars    Smokeless tobacco: Never   Vaping Use    Vaping Use: Never used   Substance and Sexual Activity    Alcohol use: Yes     Alcohol/week: 7.0 standard drinks     Types: 5 Shots of liquor, 2 Drinks containing 0.5 oz of alcohol per week    Drug use: Never    Sexual activity: Not Currently     Partners: Female     Birth control/protection: Injection      Family History   Problem Relation Age of Onset    Breast Cancer Maternal Great Grandmother     Heart Failure Maternal Grandmother      Current Outpatient Medications on File Prior to Visit   Medication Sig Dispense Refill    medroxyPROGESTERone (DEPO-PROVERA) 150 MG/ML injection INJECT 1 ML INTO THE MUSCLE ONCE FOR 1 DOSE       No current facility-administered medications on file prior to visit.      No Known Allergies    Review of

## 2023-08-04 NOTE — PROGRESS NOTES
Celia Gallo is a 25 y.o. female returns for an annual exam     Chief Complaint   Patient presents with    Annual Exam       No LMP recorded. Patient has had an injection. Her periods are absent in flow. Problems: no problems  Birth Control: Depo-Provera injections. Mom gave her shot on 7/19/2023. Last Pap: normal obtained 1 year(s) ago. She does not have a history of CHERRI 2, 3 or cervical cancer. With regard to the Gardisil vaccine, she has received all 3 injections      1. Have you been to the ER, urgent care clinic, or hospitalized since your last visit? Yes [facility] ER     2. Have you seen or consulted any other health care providers outside of the [address] [place] [address] Avenue since your last visit? No    Examination chaperoned by Zoraida Wheeler CMA.

## 2023-11-27 NOTE — ED TRIAGE NOTES
Pt arrives from home with c/o sore throat since Sunday. Pt reports having difficulties swallowing due to a sharp pain in her throat. Pt denies any SOB. Pt denies being around illness or testing for any illness recently. 98.5

## 2024-01-08 ENCOUNTER — HOSPITAL ENCOUNTER (EMERGENCY)
Facility: HOSPITAL | Age: 24
Discharge: HOME | End: 2024-01-08
Attending: EMERGENCY MEDICINE
Payer: COMMERCIAL

## 2024-01-08 VITALS
HEART RATE: 78 BPM | TEMPERATURE: 99.1 F | WEIGHT: 105 LBS | BODY MASS INDEX: 16.48 KG/M2 | OXYGEN SATURATION: 100 % | SYSTOLIC BLOOD PRESSURE: 117 MMHG | RESPIRATION RATE: 18 BRPM | HEIGHT: 67 IN | DIASTOLIC BLOOD PRESSURE: 69 MMHG

## 2024-01-08 DIAGNOSIS — Z20.822 SUSPECTED COVID-19 VIRUS INFECTION: Primary | ICD-10-CM

## 2024-01-08 LAB
FLUAV AG NPH QL IA: NEGATIVE
FLUBV AG NOSE QL IA: NEGATIVE

## 2024-01-08 PROCEDURE — 99283 EMERGENCY DEPT VISIT LOW MDM: CPT

## 2024-01-08 PROCEDURE — 87804 INFLUENZA ASSAY W/OPTIC: CPT

## 2024-01-08 ASSESSMENT — PAIN SCALES - GENERAL: PAINLEVEL_OUTOF10: 7

## 2024-01-08 ASSESSMENT — PAIN DESCRIPTION - PAIN TYPE: TYPE: ACUTE PAIN

## 2024-01-08 ASSESSMENT — PAIN - FUNCTIONAL ASSESSMENT: PAIN_FUNCTIONAL_ASSESSMENT: PREVENTS OR INTERFERES WITH ALL ACTIVE AND SOME PASSIVE ACTIVITIES

## 2024-01-08 ASSESSMENT — PAIN DESCRIPTION - LOCATION: LOCATION: HEAD

## 2024-01-08 NOTE — ED NOTES
Patient does not appear to be in any acute distress/shows no evidence of clinical instability at this time.     Provider has reviewed discharge instructions with the patient/family.  The patient/family verbalized understanding instructions as well as need for follow up for any further symptoms.     Discharge papers given, education provided, and any questions answered.

## 2024-01-08 NOTE — ED PROVIDER NOTES
either signs or Co-signs this chart in the absence of a cardiologist.         CRITICAL CARE TIME   Total Critical Care time was 0 minutes, excluding separately reportable procedures.  There was a high probability of clinically significant/life threatening deterioration in the patient's condition which required my urgent intervention.     CONSULTS:  None    PROCEDURES:  Unless otherwise noted below, none     Procedures    FINAL IMPRESSION    No diagnosis found.      DISPOSITION/PLAN   DISPOSITION      PATIENT REFERRED TO:  No follow-up provider specified.    DISCHARGE MEDICATIONS:  New Prescriptions    No medications on file     Controlled Substances Monitoring:          No data to display                (Please note that portions of this note were completed with a voice recognition program.  Efforts were made to edit the dictations but occasionally words are mis-transcribed.)    Miky Arreaga MD (electronically signed)  Attending Emergency Physician           Miky Arreaga MD  01/08/24 8660

## 2024-01-08 NOTE — ED TRIAGE NOTES
Patient arrives c/o of runny nose and HA since yesterday. States she took BC Powder and Mucinex without relief. Reports new onset of chills and \"burning\" nose this morning. Denies CP, SOB, fever, sore throat.    Vaccinated for COVID. No flu vaccine.

## 2024-04-15 ENCOUNTER — APPOINTMENT (OUTPATIENT)
Facility: HOSPITAL | Age: 24
End: 2024-04-15
Payer: COMMERCIAL

## 2024-04-15 ENCOUNTER — HOSPITAL ENCOUNTER (EMERGENCY)
Facility: HOSPITAL | Age: 24
Discharge: HOME | End: 2024-04-15
Attending: EMERGENCY MEDICINE
Payer: COMMERCIAL

## 2024-04-15 VITALS
HEART RATE: 79 BPM | WEIGHT: 105 LBS | OXYGEN SATURATION: 100 % | SYSTOLIC BLOOD PRESSURE: 124 MMHG | BODY MASS INDEX: 16.48 KG/M2 | TEMPERATURE: 98 F | HEIGHT: 67 IN | RESPIRATION RATE: 16 BRPM | DIASTOLIC BLOOD PRESSURE: 79 MMHG

## 2024-04-15 DIAGNOSIS — M79.18 MUSCULOSKELETAL PAIN: Primary | ICD-10-CM

## 2024-04-15 PROCEDURE — 71101 X-RAY EXAM UNILAT RIBS/CHEST: CPT

## 2024-04-15 PROCEDURE — 99283 EMERGENCY DEPT VISIT LOW MDM: CPT

## 2024-04-15 PROCEDURE — 6370000000 HC RX 637 (ALT 250 FOR IP): Performed by: NURSE PRACTITIONER

## 2024-04-15 RX ORDER — BUTALBITAL, ACETAMINOPHEN AND CAFFEINE 50; 325; 40 MG/1; MG/1; MG/1
1 TABLET ORAL ONCE
Status: COMPLETED | OUTPATIENT
Start: 2024-04-15 | End: 2024-04-15

## 2024-04-15 RX ORDER — METHOCARBAMOL 500 MG/1
750 TABLET, FILM COATED ORAL
Status: COMPLETED | OUTPATIENT
Start: 2024-04-15 | End: 2024-04-15

## 2024-04-15 RX ORDER — CYCLOBENZAPRINE HCL 10 MG
10 TABLET ORAL 3 TIMES DAILY PRN
Qty: 15 TABLET | Refills: 0 | Status: SHIPPED | OUTPATIENT
Start: 2024-04-15 | End: 2024-04-25

## 2024-04-15 RX ADMIN — METHOCARBAMOL TABLETS 750 MG: 500 TABLET, COATED ORAL at 12:19

## 2024-04-15 RX ADMIN — BUTALBITAL, ACETAMINOPHEN, AND CAFFEINE 1 TABLET: 50; 325; 40 TABLET ORAL at 12:35

## 2024-04-15 ASSESSMENT — PAIN - FUNCTIONAL ASSESSMENT: PAIN_FUNCTIONAL_ASSESSMENT: 0-10

## 2024-04-15 ASSESSMENT — ENCOUNTER SYMPTOMS
SINUS PAIN: 0
ABDOMINAL DISTENTION: 0
COLOR CHANGE: 0
SINUS PRESSURE: 0
ABDOMINAL PAIN: 0
NAUSEA: 0
EYE REDNESS: 0
VOMITING: 0
EYE PAIN: 0
SHORTNESS OF BREATH: 0
COUGH: 0

## 2024-04-15 ASSESSMENT — PAIN SCALES - GENERAL: PAINLEVEL_OUTOF10: 8
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
## 2024-08-02 NOTE — PROGRESS NOTES
Nikkie Han is a 23 y.o. female returns for an annual exam     Chief Complaint   Patient presents with    Annual Exam       No LMP recorded. Patient has had an injection.  Her periods are spotting in flow and varies.    Problems: had spotting last week.  Patient states she did have bleeding and cramping in May.    Birth Control: Depo-Provera injections. Mom gave injection on 5/22/2024.    Last Pap: normal obtained 1 year(s) ago.  She does not have a history of CHERRI 2, 3 or cervical cancer.   With regard to the Gardisil vaccine, she has received all 3 injections      1. Have you been to the ER, urgent care clinic, or hospitalized since your last visit? Yes    2. Have you seen or consulted any other health care providers outside of the [facility] since your last visit? No    Examination chaperoned by JAYSHREE RAMIREZ CMA.

## 2024-08-05 ENCOUNTER — TELEPHONE (OUTPATIENT)
Age: 24
End: 2024-08-05

## 2024-08-05 ENCOUNTER — OFFICE VISIT (OUTPATIENT)
Age: 24
End: 2024-08-05
Payer: COMMERCIAL

## 2024-08-05 VITALS
BODY MASS INDEX: 16.1 KG/M2 | SYSTOLIC BLOOD PRESSURE: 124 MMHG | HEIGHT: 67 IN | WEIGHT: 102.6 LBS | DIASTOLIC BLOOD PRESSURE: 80 MMHG

## 2024-08-05 DIAGNOSIS — Z01.419 WELL WOMAN EXAM WITH ROUTINE GYNECOLOGICAL EXAM: Primary | ICD-10-CM

## 2024-08-05 DIAGNOSIS — N92.6 IRREGULAR MENSES: ICD-10-CM

## 2024-08-05 DIAGNOSIS — Z20.2 ENCOUNTER FOR ASSESSMENT OF STD EXPOSURE: ICD-10-CM

## 2024-08-05 PROCEDURE — 99395 PREV VISIT EST AGE 18-39: CPT | Performed by: OBSTETRICS & GYNECOLOGY

## 2024-08-05 RX ORDER — MEDROXYPROGESTERONE ACETATE 150 MG/ML
INJECTION, SUSPENSION INTRAMUSCULAR
Qty: 1 ML | Refills: 3 | Status: SHIPPED | OUTPATIENT
Start: 2024-08-05

## 2024-08-05 NOTE — PROGRESS NOTES
ANNUAL EXAM AGES 18-39    History:  Nikkie Han is a 23 y.o. year old  Black /  female   No LMP recorded. Patient has had an injection.    She presents for her annual checkup.     No LMP recorded. Patient has had an injection.  Her periods are spotting in flow and varies.    Problems: had spotting last week.  Patient states she did have bleeding and cramping in May.  Doing every 12wk.    Birth Control: Depo-Provera injections. Mom gave injection on 2024.    Last Pap: normal obtained 1 year(s) ago.  She does not have a history of CHERRI 2, 3 or cervical cancer.   With regard to the Gardisil vaccine, she has received all 3 injections    Medical History:  Problem List:  There are no problems to display for this patient.    Past Medical History:   Diagnosis Date    Migraines     Severe dysmenorrhea      History reviewed. No pertinent surgical history.    OB History    Para Term  AB Living   0 0 0 0 0 0   SAB IAB Ectopic Molar Multiple Live Births   0 0 0 0 0 0     Social History     Socioeconomic History    Marital status: Single     Spouse name: None    Number of children: 0    Years of education: None    Highest education level: None   Occupational History    Occupation: [employer]   Tobacco Use    Smoking status: Former     Types: Cigars    Smokeless tobacco: Never   Vaping Use    Vaping Use: Never used   Substance and Sexual Activity    Alcohol use: Yes     Alcohol/week: 7.0 standard drinks of alcohol     Types: 5 Shots of liquor, 2 Drinks containing 0.5 oz of alcohol per week    Drug use: Never    Sexual activity: Not Currently     Partners: Female     Birth control/protection: Injection      Family History   Problem Relation Age of Onset    Breast Cancer Maternal Great Grandmother     Heart Failure Maternal Grandmother      No current outpatient medications on file prior to visit.     No current facility-administered medications on file prior to visit.     No

## 2024-08-05 NOTE — TELEPHONE ENCOUNTER
This nurse contacted [facility] and was told the patient needs to contact her insurance company to they would need to know why she needs it early and then  Patient would need to contact pharmacy to have them run the prescription .    Patient was sent a my chart message

## 2024-08-05 NOTE — TELEPHONE ENCOUNTER
Reece Ureña II, MD   to Me       8/5/24  1:49 PM  I  would suggest just buying one at [facility] using [facility] since it is cheap    This nurse attempted to reach the patient and left a general voice mail to call the office back.    Patient was sent a my chart message.

## 2024-08-05 NOTE — TELEPHONE ENCOUNTER
Two patient identifiers used      23 year old patient seen in the office today . A new prescription for depo injection sent today.  Patient reports she received her depo early on 5/22/2024 and has been bleeding for two weeks.    Patient is wondering if we can expedite her refill that the pharmacy states is too early for .    Please advise          Patient was told to contact her insurance company

## 2024-08-09 LAB
., LABCORP: NORMAL
C TRACH RRNA CVX QL NAA+PROBE: NEGATIVE
CYTOLOGIST CVX/VAG CYTO: NORMAL
CYTOLOGY CVX/VAG DOC CYTO: NORMAL
CYTOLOGY CVX/VAG DOC THIN PREP: NORMAL
DX ICD CODE: NORMAL
Lab: NORMAL
Lab: NORMAL
N GONORRHOEA RRNA CVX QL NAA+PROBE: NEGATIVE
OTHER STN SPEC: NORMAL
STAT OF ADQ CVX/VAG CYTO-IMP: NORMAL
T VAGINALIS RRNA SPEC QL NAA+PROBE: NEGATIVE

## 2025-02-27 ENCOUNTER — HOSPITAL ENCOUNTER (EMERGENCY)
Facility: HOSPITAL | Age: 25
Discharge: HOME | End: 2025-02-27
Attending: EMERGENCY MEDICINE
Payer: COMMERCIAL

## 2025-02-27 VITALS
TEMPERATURE: 99 F | HEIGHT: 67 IN | OXYGEN SATURATION: 100 % | DIASTOLIC BLOOD PRESSURE: 51 MMHG | RESPIRATION RATE: 16 BRPM | SYSTOLIC BLOOD PRESSURE: 106 MMHG | WEIGHT: 105 LBS | BODY MASS INDEX: 16.48 KG/M2 | HEART RATE: 90 BPM

## 2025-02-27 DIAGNOSIS — R11.2 NAUSEA VOMITING AND DIARRHEA: Primary | ICD-10-CM

## 2025-02-27 DIAGNOSIS — R19.7 NAUSEA VOMITING AND DIARRHEA: Primary | ICD-10-CM

## 2025-02-27 LAB
ALBUMIN SERPL-MCNC: 4.2 G/DL (ref 3.5–5.2)
ALBUMIN/GLOB SERPL: 1.3 (ref 1.1–2.2)
ALP SERPL-CCNC: 67 U/L (ref 35–104)
ALT SERPL-CCNC: 9 U/L (ref 10–35)
ANION GAP SERPL CALC-SCNC: 13 MMOL/L (ref 2–12)
APPEARANCE UR: CLEAR
AST SERPL-CCNC: 20 U/L (ref 10–35)
BACTERIA URNS QL MICRO: ABNORMAL /HPF
BASOPHILS # BLD: 0.09 K/UL (ref 0–0.1)
BASOPHILS NFR BLD: 1 % (ref 0–1)
BILIRUB SERPL-MCNC: 0.5 MG/DL (ref 0.2–1)
BILIRUB UR QL: NEGATIVE
BUN SERPL-MCNC: 16 MG/DL (ref 6–20)
BUN/CREAT SERPL: 27 (ref 12–20)
CALCIUM SERPL-MCNC: 8.6 MG/DL (ref 8.6–10)
CHLORIDE SERPL-SCNC: 104 MMOL/L (ref 98–107)
CO2 SERPL-SCNC: 22 MMOL/L (ref 22–29)
COLOR UR: ABNORMAL
CREAT SERPL-MCNC: 0.6 MG/DL (ref 0.5–0.9)
DIFFERENTIAL METHOD BLD: ABNORMAL
EOSINOPHIL # BLD: 0 K/UL (ref 0–0.4)
EOSINOPHIL NFR BLD: 0 % (ref 0–7)
EPITH CASTS URNS QL MICRO: ABNORMAL /LPF
ERYTHROCYTE [DISTWIDTH] IN BLOOD BY AUTOMATED COUNT: 13.2 % (ref 11.5–14.5)
GLOBULIN SER CALC-MCNC: 3.3 G/DL (ref 2–4)
GLUCOSE SERPL-MCNC: 115 MG/DL (ref 65–100)
GLUCOSE UR STRIP.AUTO-MCNC: NEGATIVE MG/DL
HCT VFR BLD AUTO: 37.1 % (ref 35–47)
HGB BLD-MCNC: 12.6 G/DL (ref 11.5–16)
HGB UR QL STRIP: ABNORMAL
IMM GRANULOCYTES # BLD AUTO: 0 K/UL
IMM GRANULOCYTES NFR BLD AUTO: 0 %
KETONES UR QL STRIP.AUTO: NEGATIVE MG/DL
LEUKOCYTE ESTERASE UR QL STRIP.AUTO: NEGATIVE
LIPASE SERPL-CCNC: 23 U/L (ref 13–60)
LYMPHOCYTES # BLD: 0.27 K/UL (ref 0.8–3.5)
LYMPHOCYTES NFR BLD: 3 % (ref 12–49)
MCH RBC QN AUTO: 28.8 PG (ref 26–34)
MCHC RBC AUTO-ENTMCNC: 34 G/DL (ref 30–36.5)
MCV RBC AUTO: 84.9 FL (ref 80–99)
MONOCYTES # BLD: 0.55 K/UL (ref 0–1)
MONOCYTES NFR BLD: 6 % (ref 5–13)
MUCOUS THREADS URNS QL MICRO: ABNORMAL /LPF
NEUTS BAND NFR BLD MANUAL: 4 % (ref 0–6)
NEUTS SEG # BLD: 8.19 K/UL (ref 1.8–8)
NEUTS SEG NFR BLD: 86 % (ref 32–75)
NITRITE UR QL STRIP.AUTO: NEGATIVE
NRBC # BLD: 0 K/UL (ref 0–0.01)
NRBC BLD-RTO: 0 PER 100 WBC
PH UR STRIP: 6 (ref 5–8)
PLATELET # BLD AUTO: 272 K/UL (ref 150–400)
PMV BLD AUTO: 10.5 FL (ref 8.9–12.9)
POTASSIUM SERPL-SCNC: 4 MMOL/L (ref 3.5–5.1)
PROT SERPL-MCNC: 7.5 G/DL (ref 6.4–8.3)
PROT UR STRIP-MCNC: NEGATIVE MG/DL
RBC # BLD AUTO: 4.37 M/UL (ref 3.8–5.2)
RBC #/AREA URNS HPF: ABNORMAL /HPF
RBC MORPH BLD: ABNORMAL
SODIUM SERPL-SCNC: 139 MMOL/L (ref 136–145)
SP GR UR REFRACTOMETRY: 1.03 (ref 1–1.03)
SPECIMEN HOLD: NORMAL
UROBILINOGEN UR QL STRIP.AUTO: 0.2 EU/DL (ref 0.2–1)
WBC # BLD AUTO: 9.1 K/UL (ref 3.6–11)
WBC MORPH BLD: ABNORMAL
WBC URNS QL MICRO: ABNORMAL /HPF (ref 0–4)

## 2025-02-27 PROCEDURE — 85025 COMPLETE CBC W/AUTO DIFF WBC: CPT

## 2025-02-27 PROCEDURE — 81001 URINALYSIS AUTO W/SCOPE: CPT

## 2025-02-27 PROCEDURE — 6360000002 HC RX W HCPCS

## 2025-02-27 PROCEDURE — 96361 HYDRATE IV INFUSION ADD-ON: CPT

## 2025-02-27 PROCEDURE — 96374 THER/PROPH/DIAG INJ IV PUSH: CPT

## 2025-02-27 PROCEDURE — 83690 ASSAY OF LIPASE: CPT

## 2025-02-27 PROCEDURE — 96375 TX/PRO/DX INJ NEW DRUG ADDON: CPT

## 2025-02-27 PROCEDURE — 99284 EMERGENCY DEPT VISIT MOD MDM: CPT

## 2025-02-27 PROCEDURE — 36415 COLL VENOUS BLD VENIPUNCTURE: CPT

## 2025-02-27 PROCEDURE — 80053 COMPREHEN METABOLIC PANEL: CPT

## 2025-02-27 PROCEDURE — 2580000003 HC RX 258

## 2025-02-27 RX ORDER — ONDANSETRON 2 MG/ML
4 INJECTION INTRAMUSCULAR; INTRAVENOUS ONCE
Status: COMPLETED | OUTPATIENT
Start: 2025-02-27 | End: 2025-02-27

## 2025-02-27 RX ORDER — 0.9 % SODIUM CHLORIDE 0.9 %
1000 INTRAVENOUS SOLUTION INTRAVENOUS ONCE
Status: COMPLETED | OUTPATIENT
Start: 2025-02-27 | End: 2025-02-27

## 2025-02-27 RX ORDER — ONDANSETRON 4 MG/1
4 TABLET, ORALLY DISINTEGRATING ORAL 3 TIMES DAILY PRN
Qty: 21 TABLET | Refills: 0 | Status: SHIPPED | OUTPATIENT
Start: 2025-02-27

## 2025-02-27 RX ORDER — KETOROLAC TROMETHAMINE 30 MG/ML
15 INJECTION, SOLUTION INTRAMUSCULAR; INTRAVENOUS ONCE
Status: COMPLETED | OUTPATIENT
Start: 2025-02-27 | End: 2025-02-27

## 2025-02-27 RX ADMIN — SODIUM CHLORIDE 1000 ML: 0.9 INJECTION, SOLUTION INTRAVENOUS at 09:54

## 2025-02-27 RX ADMIN — KETOROLAC TROMETHAMINE 15 MG: 30 INJECTION, SOLUTION INTRAMUSCULAR at 09:57

## 2025-02-27 RX ADMIN — ONDANSETRON 4 MG: 2 INJECTION, SOLUTION INTRAMUSCULAR; INTRAVENOUS at 09:56

## 2025-02-27 ASSESSMENT — PAIN SCALES - GENERAL: PAINLEVEL_OUTOF10: 9

## 2025-02-27 ASSESSMENT — ENCOUNTER SYMPTOMS
VOMITING: 1
NAUSEA: 1
DIARRHEA: 1

## 2025-02-27 ASSESSMENT — PAIN - FUNCTIONAL ASSESSMENT: PAIN_FUNCTIONAL_ASSESSMENT: 0-10

## 2025-02-27 ASSESSMENT — PAIN DESCRIPTION - LOCATION: LOCATION: HEAD;GENERALIZED

## 2025-02-27 NOTE — DISCHARGE INSTRUCTIONS
You were seen in the emergency department for nausea, vomiting and diarrhea. Your symptoms appear to be due to a viral illness. Viral illnesses are treated with supportive care, including your fluid intake, over the counter fever and pain reducers such as ibuprofen or tylenol, and rest. Take all other medications as prescribed and directed.     Your condition should improve over the next 5 days with the care discussed. You should return to the ER- if you experience increased fevers that do not improve with use of Tylenol and Motrin (as directed),  Inability to tolerate oral intake, increased shortness of breath, neck stiffness, confusion, or other worsening or worrisome concerns. You should follow up with your primary care provider this week for further evaluation.

## 2025-02-27 NOTE — ED PROVIDER NOTES
[facility]  EMERGENCY DEPARTMENT ENCOUNTER      Pt Name: Nikkie Han  MRN: [id number]  Birthdate 2000  Date of evaluation: 2/27/2025  Provider: Paty Chu PA-C    CHIEF COMPLAINT       Chief Complaint   Patient presents with    Vomiting    Diarrhea         HISTORY OF PRESENT ILLNESS   (Location/Symptom, Timing/Onset, Context/Setting, Quality, Duration, Modifying Factors, Severity)  Note limiting factors.   Nikkie Han is a 24 y.o. female with history of  has a past medical history of Migraines and Severe dysmenorrhea. who presents to [facility] ED with cc of nausea, vomiting and diarrhea beginning overnight.  Patient's parents are both sick with similar symptoms.  She reports her mother was seen here Sunday night and told she likely has norovirus.  Patient states she has been unable to tolerate p.o. intake since her symptoms began.  Reports numerous episodes of emesis and diarrhea.  No medication prior to arrival today.  Denies abdominal pain.  She reports body aches and chills.  Emesis is nonbloody nonbilious.  Diarrhea is nonbloody.  No recent travel or antibiotics.  She has not had previous abdominal surgeries.        Social History: Denies alcohol use, tobacco use or drug use    PCP: Lucrecia Mars MD    There are no other complaints, changes or physical findings at this time.        The history is provided by the patient.         Review of External Medical Records:     Nursing Notes were reviewed.    REVIEW OF SYSTEMS    (2-9 systems for level 4, 10 or more for level 5)     Review of Systems   Gastrointestinal:  Positive for diarrhea, nausea and vomiting.       Except as noted above the remainder of the review of systems was reviewed and negative.       PAST MEDICAL HISTORY     Past Medical History:   Diagnosis Date    Migraines     Severe dysmenorrhea          SURGICAL HISTORY     History reviewed. No pertinent surgical history.      CURRENT MEDICATIONS

## 2025-02-27 NOTE — ED TRIAGE NOTES
Pt ambulatory to ED w/ c/o V/D starting yesterday. Pt endorses headache. Pt's mom dx w/ norovirus last week. Pt denies abdominal pain. Denies cough and fever.

## 2025-04-23 ENCOUNTER — TELEPHONE (OUTPATIENT)
Age: 25
End: 2025-04-23

## 2025-04-23 RX ORDER — MEDROXYPROGESTERONE ACETATE 150 MG/ML
INJECTION, SUSPENSION INTRAMUSCULAR
Qty: 1 ML | Refills: 0 | Status: SHIPPED | OUTPATIENT
Start: 2025-04-23

## 2025-04-23 NOTE — TELEPHONE ENCOUNTER
24 year old patient last seen in the office 8/5/2024 and has next ae on 8/7/2025    Requested medication last sent on 8/5/2024 for 1 injection and 3 refills.    Patient states she has next appointment for depo injection and needs refill before she sees you    Rx pended for review and sign    Thank you

## 2025-04-23 NOTE — TELEPHONE ENCOUNTER
Reece SCHULTE MD to Me      4/23/25  2:31 PM  She shouldn't be out yet, but I will send in the refill    Prescription reviewed and sent by Md

## 2025-05-31 ENCOUNTER — APPOINTMENT (OUTPATIENT)
Facility: HOSPITAL | Age: 25
End: 2025-05-31
Payer: COMMERCIAL

## 2025-05-31 ENCOUNTER — HOSPITAL ENCOUNTER (EMERGENCY)
Facility: HOSPITAL | Age: 25
Discharge: HOME | End: 2025-05-31
Attending: EMERGENCY MEDICINE
Payer: COMMERCIAL

## 2025-05-31 VITALS
TEMPERATURE: 99.1 F | OXYGEN SATURATION: 98 % | BODY MASS INDEX: 16.48 KG/M2 | SYSTOLIC BLOOD PRESSURE: 121 MMHG | DIASTOLIC BLOOD PRESSURE: 87 MMHG | HEART RATE: 90 BPM | HEIGHT: 67 IN | WEIGHT: 105 LBS | RESPIRATION RATE: 13 BRPM

## 2025-05-31 DIAGNOSIS — R07.89 CHEST WALL PAIN: Primary | ICD-10-CM

## 2025-05-31 PROCEDURE — 99284 EMERGENCY DEPT VISIT MOD MDM: CPT

## 2025-05-31 PROCEDURE — 93005 ELECTROCARDIOGRAM TRACING: CPT | Performed by: NURSE PRACTITIONER

## 2025-05-31 PROCEDURE — 71046 X-RAY EXAM CHEST 2 VIEWS: CPT

## 2025-05-31 RX ORDER — CYCLOBENZAPRINE HCL 10 MG
10 TABLET ORAL 3 TIMES DAILY PRN
Qty: 21 TABLET | Refills: 0 | Status: SHIPPED | OUTPATIENT
Start: 2025-05-31 | End: 2025-06-10

## 2025-05-31 RX ORDER — IBUPROFEN 800 MG/1
800 TABLET, FILM COATED ORAL EVERY 8 HOURS PRN
Qty: 30 TABLET | Refills: 0 | Status: SHIPPED | OUTPATIENT
Start: 2025-05-31

## 2025-05-31 RX ORDER — LIDOCAINE 50 MG/G
1 PATCH TOPICAL DAILY
Qty: 10 PATCH | Refills: 0 | Status: SHIPPED | OUTPATIENT
Start: 2025-05-31 | End: 2025-06-10

## 2025-05-31 ASSESSMENT — PAIN - FUNCTIONAL ASSESSMENT: PAIN_FUNCTIONAL_ASSESSMENT: NONE - DENIES PAIN

## 2025-05-31 NOTE — ED PROVIDER NOTES
[facility]  EMERGENCY DEPARTMENT ENCOUNTER      Pt Name: Nikkie Han  MRN: [id number]  Birthdate 2000  Date of evaluation: 5/31/2025  Provider: MAME Nieves NP    CHIEF COMPLAINT       Chief Complaint   Patient presents with    Chest Pain         HISTORY OF PRESENT ILLNESS   (Location/Symptom, Timing/Onset, Context/Setting, Quality, Duration, Modifying Factors, Severity)  Note limiting factors.   The history is provided by the patient. No  was used.       Nikkie Han is a 24 y.o. female without any reported PMhx who presents ambulatory by themself to [facility] ED with cc of chest pain.     Patient reports that she has had musculoskeletal chest pain since this morning.  She states that the pain started when she pulled her self up in bed.  Pain only occurs with movement.  Has a history of this similar type of pain, with precipitating injury.  No known injury this time.  Movement only worsens pain.    Denoes F/C, N/V/D, cough, congestion, LE swelling, SOB, urinary concerns.  Has daily abuse of tobacco, alcohol or other substances.  Denies chance of pregnancy.      PCP: Lucrecia Mars MD    There are no other complaints, changes or physical findings at this time.        Review of External Medical Records:     Nursing Notes were reviewed.    REVIEW OF SYSTEMS    (2-9 systems for level 4, 10 or more for level 5)     Review of Systems   Cardiovascular:  Positive for chest pain.       Except as noted above the remainder of the review of systems was reviewed and negative.       PAST MEDICAL HISTORY     Past Medical History:   Diagnosis Date    Migraines     Severe dysmenorrhea          SURGICAL HISTORY     History reviewed. No pertinent surgical history.      CURRENT MEDICATIONS       Discharge Medication List as of 5/31/2025  6:44 PM        CONTINUE these medications which have NOT CHANGED    Details   medroxyPROGESTERone (DEPO-PROVERA) 150 MG/ML  Efforts were made to edit the dictations but occasionally words are mis-transcribed.)    MAME Nieves NP (electronically signed)  Emergency Attending Physician / Physician Assistant / Nurse Practitioner             Aleshia Pride APRN - NP  05/31/25 [id number]

## 2025-05-31 NOTE — DISCHARGE INSTRUCTIONS
Chest wall pain is pain in the bones, cartilage, or muscles that make up the chest wall. Chest wall pain occurs in a specific area of the chest and may feel worse when pressure is applied to the area.    Chest wall pain can be caused by many problems, including:  An injury, such as a blow to the chest.  Prolonged or violent coughing, which can strain the muscles or ligaments in the chest.  Inflammation of the cartilage of the rib cage (costochondritis).    Chest wall pain usually feels different than the chest pain of a heart attack. Breathing deeply, lying on the affected area, or moving, such as twisting to the side or raising the arms, also can make chest wall pain feel worse.    Treatment for chest wall pain depends on the cause of the pain. Minor chest wall pain is treated with rest, ice or heat applied to the area, and non-steroidal anti-inflammatory drugs (NSAIDs) or acetaminophen. If the chest wall pain is the result of coughing, the pain should improve as the cough improves.

## 2025-05-31 NOTE — ED TRIAGE NOTES
Pt reports to ED via POV by herself. Pt ambulates independently without difficulty.    Pt states that she has discomfort in upper chest when lifting/moving arms; pt states the discomfort began this morning. Pt denies pain when she does not move. Pt denies shortness of breath, nausea, dizziness.

## 2025-06-01 LAB
EKG ATRIAL RATE: 77 BPM
EKG DIAGNOSIS: NORMAL
EKG P AXIS: -27 DEGREES
EKG P-R INTERVAL: 106 MS
EKG Q-T INTERVAL: 344 MS
EKG QRS DURATION: 74 MS
EKG QTC CALCULATION (BAZETT): 389 MS
EKG R AXIS: 86 DEGREES
EKG T AXIS: 45 DEGREES
EKG VENTRICULAR RATE: 77 BPM

## 2025-06-01 PROCEDURE — 93010 ELECTROCARDIOGRAM REPORT: CPT | Performed by: SPECIALIST

## 2025-07-07 RX ORDER — MEDROXYPROGESTERONE ACETATE 150 MG/ML
150 INJECTION, SUSPENSION INTRAMUSCULAR ONCE
Refills: 3 | OUTPATIENT
Start: 2025-07-07 | End: 2025-07-07

## 2025-08-07 ENCOUNTER — OFFICE VISIT (OUTPATIENT)
Age: 25
End: 2025-08-07
Payer: COMMERCIAL

## 2025-08-07 VITALS
DIASTOLIC BLOOD PRESSURE: 78 MMHG | HEART RATE: 90 BPM | SYSTOLIC BLOOD PRESSURE: 102 MMHG | HEIGHT: 67 IN | BODY MASS INDEX: 16.73 KG/M2 | WEIGHT: 106.6 LBS | OXYGEN SATURATION: 97 %

## 2025-08-07 DIAGNOSIS — Z12.4 ENCOUNTER FOR PAPANICOLAOU SMEAR FOR CERVICAL CANCER SCREENING: Primary | ICD-10-CM

## 2025-08-07 DIAGNOSIS — Z01.419 WELL WOMAN EXAM WITH ROUTINE GYNECOLOGICAL EXAM: ICD-10-CM

## 2025-08-07 PROCEDURE — 99395 PREV VISIT EST AGE 18-39: CPT | Performed by: OBSTETRICS & GYNECOLOGY

## 2025-08-07 RX ORDER — MEDROXYPROGESTERONE ACETATE 150 MG/ML
INJECTION, SUSPENSION INTRAMUSCULAR
Qty: 1 ML | Refills: 3 | Status: SHIPPED | OUTPATIENT
Start: 2025-08-07

## 2025-08-07 SDOH — ECONOMIC STABILITY: INCOME INSECURITY: IN THE LAST 12 MONTHS, WAS THERE A TIME WHEN YOU WERE NOT ABLE TO PAY THE MORTGAGE OR RENT ON TIME?: PATIENT DECLINED

## 2025-08-07 SDOH — ECONOMIC STABILITY: FOOD INSECURITY: WITHIN THE PAST 12 MONTHS, YOU WORRIED THAT YOUR FOOD WOULD RUN OUT BEFORE YOU GOT MONEY TO BUY MORE.: PATIENT DECLINED

## 2025-08-07 SDOH — ECONOMIC STABILITY: FOOD INSECURITY: WITHIN THE PAST 12 MONTHS, THE FOOD YOU BOUGHT JUST DIDN'T LAST AND YOU DIDN'T HAVE MONEY TO GET MORE.: PATIENT DECLINED

## 2025-08-07 SDOH — ECONOMIC STABILITY: TRANSPORTATION INSECURITY
IN THE PAST 12 MONTHS, HAS LACK OF TRANSPORTATION KEPT YOU FROM MEETINGS, WORK, OR FROM GETTING THINGS NEEDED FOR DAILY LIVING?: PATIENT DECLINED

## 2025-08-07 SDOH — ECONOMIC STABILITY: TRANSPORTATION INSECURITY
IN THE PAST 12 MONTHS, HAS THE LACK OF TRANSPORTATION KEPT YOU FROM MEDICAL APPOINTMENTS OR FROM GETTING MEDICATIONS?: PATIENT DECLINED

## 2025-08-07 ASSESSMENT — PATIENT HEALTH QUESTIONNAIRE - PHQ9
SUM OF ALL RESPONSES TO PHQ QUESTIONS 1-9: 0
2. FEELING DOWN, DEPRESSED OR HOPELESS: NOT AT ALL
SUM OF ALL RESPONSES TO PHQ QUESTIONS 1-9: 0
1. LITTLE INTEREST OR PLEASURE IN DOING THINGS: NOT AT ALL
SUM OF ALL RESPONSES TO PHQ9 QUESTIONS 1 & 2: 0
1. LITTLE INTEREST OR PLEASURE IN DOING THINGS: NOT AT ALL
2. FEELING DOWN, DEPRESSED OR HOPELESS: NOT AT ALL
SUM OF ALL RESPONSES TO PHQ QUESTIONS 1-9: 0
SUM OF ALL RESPONSES TO PHQ QUESTIONS 1-9: 0

## 2025-08-11 LAB
., LABCORP: NORMAL
C TRACH RRNA CVX QL NAA+PROBE: NEGATIVE
CYTOLOGIST CVX/VAG CYTO: NORMAL
CYTOLOGY CVX/VAG DOC CYTO: NORMAL
CYTOLOGY CVX/VAG DOC THIN PREP: NORMAL
DX ICD CODE: NORMAL
N GONORRHOEA RRNA CVX QL NAA+PROBE: NEGATIVE
OTHER STN SPEC: NORMAL
SERVICE CMNT-IMP: NORMAL
STAT OF ADQ CVX/VAG CYTO-IMP: NORMAL
T VAGINALIS RRNA SPEC QL NAA+PROBE: NEGATIVE